# Patient Record
Sex: FEMALE | Race: WHITE | NOT HISPANIC OR LATINO | Employment: FULL TIME | URBAN - NONMETROPOLITAN AREA
[De-identification: names, ages, dates, MRNs, and addresses within clinical notes are randomized per-mention and may not be internally consistent; named-entity substitution may affect disease eponyms.]

---

## 2023-12-07 ENCOUNTER — OFFICE VISIT (OUTPATIENT)
Dept: ENDOCRINOLOGY | Facility: CLINIC | Age: 38
End: 2023-12-07
Payer: COMMERCIAL

## 2023-12-07 ENCOUNTER — TELEPHONE (OUTPATIENT)
Dept: ENDOCRINOLOGY | Facility: CLINIC | Age: 38
End: 2023-12-07

## 2023-12-07 ENCOUNTER — LAB (OUTPATIENT)
Dept: LAB | Facility: HOSPITAL | Age: 38
End: 2023-12-07
Payer: COMMERCIAL

## 2023-12-07 VITALS
HEART RATE: 76 BPM | HEIGHT: 64 IN | SYSTOLIC BLOOD PRESSURE: 126 MMHG | BODY MASS INDEX: 43.57 KG/M2 | OXYGEN SATURATION: 97 % | WEIGHT: 255.2 LBS | DIASTOLIC BLOOD PRESSURE: 84 MMHG

## 2023-12-07 DIAGNOSIS — E66.01 CLASS 3 SEVERE OBESITY WITH SERIOUS COMORBIDITY AND BODY MASS INDEX (BMI) OF 40.0 TO 44.9 IN ADULT, UNSPECIFIED OBESITY TYPE: Primary | ICD-10-CM

## 2023-12-07 DIAGNOSIS — E22.1 HYPERPROLACTINEMIA: ICD-10-CM

## 2023-12-07 PROBLEM — E66.813 CLASS 3 SEVERE OBESITY WITH SERIOUS COMORBIDITY AND BODY MASS INDEX (BMI) OF 40.0 TO 44.9 IN ADULT: Status: ACTIVE | Noted: 2023-12-07

## 2023-12-07 LAB
ALBUMIN SERPL-MCNC: 4.5 G/DL (ref 3.5–5.2)
ALBUMIN/GLOB SERPL: 1.5 G/DL
ALP SERPL-CCNC: 93 U/L (ref 39–117)
ALT SERPL W P-5'-P-CCNC: 40 U/L (ref 1–33)
ANION GAP SERPL CALCULATED.3IONS-SCNC: 9.4 MMOL/L (ref 5–15)
AST SERPL-CCNC: 32 U/L (ref 1–32)
BILIRUB SERPL-MCNC: 0.4 MG/DL (ref 0–1.2)
BUN SERPL-MCNC: 8 MG/DL (ref 6–20)
BUN/CREAT SERPL: 9 (ref 7–25)
CALCIUM SPEC-SCNC: 9.5 MG/DL (ref 8.6–10.5)
CHLORIDE SERPL-SCNC: 107 MMOL/L (ref 98–107)
CHOLEST SERPL-MCNC: 136 MG/DL (ref 0–200)
CO2 SERPL-SCNC: 26.6 MMOL/L (ref 22–29)
CREAT SERPL-MCNC: 0.89 MG/DL (ref 0.57–1)
EGFRCR SERPLBLD CKD-EPI 2021: 86.3 ML/MIN/1.73
GLOBULIN UR ELPH-MCNC: 3 GM/DL
GLUCOSE SERPL-MCNC: 102 MG/DL (ref 65–99)
HDLC SERPL-MCNC: 45 MG/DL (ref 40–60)
LDLC SERPL CALC-MCNC: 70 MG/DL (ref 0–100)
LDLC/HDLC SERPL: 1.52 {RATIO}
POTASSIUM SERPL-SCNC: 4 MMOL/L (ref 3.5–5.2)
PROT SERPL-MCNC: 7.5 G/DL (ref 6–8.5)
SODIUM SERPL-SCNC: 143 MMOL/L (ref 136–145)
T4 FREE SERPL-MCNC: 1.15 NG/DL (ref 0.93–1.7)
TRIGL SERPL-MCNC: 113 MG/DL (ref 0–150)
TSH SERPL DL<=0.05 MIU/L-ACNC: 2.02 UIU/ML (ref 0.27–4.2)
VLDLC SERPL-MCNC: 21 MG/DL (ref 5–40)

## 2023-12-07 PROCEDURE — 84439 ASSAY OF FREE THYROXINE: CPT | Performed by: NURSE PRACTITIONER

## 2023-12-07 PROCEDURE — 84443 ASSAY THYROID STIM HORMONE: CPT | Performed by: NURSE PRACTITIONER

## 2023-12-07 PROCEDURE — 80061 LIPID PANEL: CPT | Performed by: NURSE PRACTITIONER

## 2023-12-07 PROCEDURE — 80053 COMPREHEN METABOLIC PANEL: CPT | Performed by: NURSE PRACTITIONER

## 2023-12-07 RX ORDER — CABERGOLINE 0.5 MG/1
0.5 TABLET ORAL 2 TIMES WEEKLY
Qty: 8 TABLET | Refills: 0 | Status: SHIPPED | OUTPATIENT
Start: 2023-12-07 | End: 2024-12-06

## 2023-12-07 RX ORDER — ATORVASTATIN CALCIUM 10 MG/1
TABLET, FILM COATED ORAL
COMMUNITY
Start: 2023-10-02

## 2023-12-07 NOTE — PROGRESS NOTES
Chief Complaint   Patient presents with    Elevated prolactin level        Referring Provider  Mario Dawson APRN HPI   Karen Al is a 38 y.o. female had concerns including Elevated prolactin level.    Seen as a new patient.  Elevated Prolactin.    She was having very irregular menstrual cycles.  [facility] to [facility] and labs and was noted to have an elevated prolactin level on labs. She has had consecutive levels above range.  She had an MRI that was normal as well.    She is not on any medication at this time that would result in hyperprolactinemia.    Weight Gain:  She has tried diet and exercise in the past and has not been able to lose weight.  She was able to lose a few pounds with weight loss injections in the past, but was no longer covered on her insruacne.  She does not eat large quanities of food.    No past medical history on file.  No past surgical history on file.   No family history on file.   Social History     Socioeconomic History    Marital status:    Tobacco Use    Smoking status: Never     Passive exposure: Never    Smokeless tobacco: Never   Substance and Sexual Activity    Alcohol use: Never    Drug use: Never    Sexual activity: Defer      No Known Allergies   Current Outpatient Medications on File Prior to Visit   Medication Sig Dispense Refill    atorvastatin (LIPITOR) 10 MG tablet       metFORMIN (GLUCOPHAGE) 500 MG tablet Take 1 tablet by mouth Daily With Breakfast.       No current facility-administered medications on file prior to visit.      The following portions of the patient's history were reviewed and updated as appropriate: allergies, current medications, past family history, past medical history, past social history, past surgical history, and problem list.    Review of Systems   Constitutional:  Positive for fatigue and unexpected weight gain.   Eyes: Negative.    Endocrine: Negative.    Skin:         Darkening skin around the back  "of her neck and armpits.   Psychiatric/Behavioral: Negative.     All other systems reviewed and are negative.     I have reviewed and confirmed the accuracy of the ROS as documented by the MA/LPN/RN CYNTHIA Rm    /84 (BP Location: Left arm, Patient Position: Sitting, Cuff Size: Adult)   Pulse 76   Ht 162.6 cm (64\")   Wt 116 kg (255 lb 3.2 oz)   SpO2 97%   BMI 43.80 kg/m²      Physical Exam  Vitals reviewed.   Constitutional:       Appearance: Normal appearance. She is obese.   Eyes:      Extraocular Movements: Extraocular movements intact.   Cardiovascular:      Rate and Rhythm: Normal rate.   Pulmonary:      Effort: Pulmonary effort is normal.   Skin:     General: Skin is warm and dry.      Comments: Acanthosis nigrans noted to posterior cervical region.   Neurological:      General: No focal deficit present.      Mental Status: She is alert and oriented to person, place, and time.   Psychiatric:         Mood and Affect: Mood normal.         Behavior: Behavior normal.         Thought Content: Thought content normal.         Judgment: Judgment normal.       Labs/Imaging  CMP:  Lab Results   Component Value Date    BUN 8 12/07/2023    CREATININE 0.89 12/07/2023    BCR 9.0 12/07/2023     12/07/2023    K 4.0 12/07/2023    CO2 26.6 12/07/2023    CALCIUM 9.5 12/07/2023    ALBUMIN 4.5 12/07/2023    BILITOT 0.4 12/07/2023    ALKPHOS 93 12/07/2023    AST 32 12/07/2023    ALT 40 (H) 12/07/2023     Lipid Panel:  Lab Results   Component Value Date    CHOL 136 12/07/2023    TRIG 113 12/07/2023    HDL 45 12/07/2023    VLDL 21 12/07/2023    LDL 70 12/07/2023     HbA1c:     Glucose:  No results found for: \"POCGLU\"  TSH:  Lab Results   Component Value Date    TSH 2.020 12/07/2023       Assessment and Plan    Diagnoses and all orders for this visit:    1. Class 3 severe obesity with serious comorbidity and body mass index (BMI) of 40.0 to 44.9 in adult, unspecified obesity type (Primary)  Assessment & " Plan:  Patient's (Body mass index is 43.8 kg/m².) indicates that they are morbidly/severely obese (BMI > 40 or > 35 with obesity - related health condition) with health conditions that include dyslipidemias . Weight is unchanged. BMI  is above average; BMI management plan is completed. We discussed low calorie, low carb based diet program, portion control, increasing exercise, and pharmacologic options including Zepbound 2.5 mg weekly . Patient has no personal history of pancreatitis, no family history of MEN syndrome or medullary thyroid cancer. Possible side effects including nausea, bloating, other GI upset and rarely pancreatitis were discussed. She was advised to call the office with any symptoms or concerns.       Orders:  -     Comprehensive Metabolic Panel  -     Lipid Panel  -     TSH  -     T4, free    2. Hyperprolactinemia  Assessment & Plan:  Labs show that patient has elevated levels.  MRI was negative for structural abnormalities.  Will start on Cabergoline 0.5 mg twice weekly.  Follow-up in 2 months.      Other orders  -     cabergoline (DOSTINEX) 0.5 MG tablet; Take 1 tablet by mouth 2 (Two) Times a Week.  Dispense: 8 tablet; Refill: 0  -     Tirzepatide-Weight Management (ZEPBOUND) 2.5 MG/0.5ML solution auto-injector; Inject 0.5 mL under the skin into the appropriate area as directed 1 (One) Time Per Week.  Dispense: 2 mL; Refill: 0         Return in about 2 months (around 2/7/2024) for Follow-up appointment. The patient was instructed to contact the clinic with any interval questions or concerns.      This document has been electronically signed by CYNTHIA Rm  December 7, 2023 11:18 EST   Endocrinology    Please note that portions of this document were completed with a voice recognition program. Efforts were made to edit the dictations, but occasionally words are mis-transcribed.

## 2023-12-07 NOTE — ASSESSMENT & PLAN NOTE
Patient's (Body mass index is 43.8 kg/m².) indicates that they are morbidly/severely obese (BMI > 40 or > 35 with obesity - related health condition) with health conditions that include dyslipidemias . Weight is unchanged. BMI  is above average; BMI management plan is completed. We discussed low calorie, low carb based diet program, portion control, increasing exercise, and pharmacologic options including Zepbound 2.5 mg weekly . Patient has no personal history of pancreatitis, no family history of MEN syndrome or medullary thyroid cancer. Possible side effects including nausea, bloating, other GI upset and rarely pancreatitis were discussed. She was advised to call the office with any symptoms or concerns.

## 2023-12-07 NOTE — TELEPHONE ENCOUNTER
Let her know that we checked and her insurance is not covering the medication.  None of the weight loss agents are covered.  She will have to call her insurance to see if they are going to be covering it.  If not, there isn't anything else that we can do to get the payment cheaper.

## 2023-12-07 NOTE — ASSESSMENT & PLAN NOTE
Labs show that patient has elevated levels.  MRI was negative for structural abnormalities.  Will start on Cabergoline 0.5 mg twice weekly.  Follow-up in 2 months.

## 2023-12-07 NOTE — TELEPHONE ENCOUNTER
Caller: Karen Al    Relationship to patient: Self    Best call back number: [phone] (home)      Patient is needing: HAS QUESTIONS TO ASK ABOUT THE FOLLOWING MEDICATION --  Tirzepatide-Weight Management (ZEPBOUND) 2.5 MG/0.5ML solution auto-injector   THE DISCOUNT CARD WASN'T ENOUGH TO COVER. IT'LL BE $900 FOR THIS MEDICATION. PLEASE CALL BACK WHEN YOU CAN. THANKS!

## 2023-12-11 NOTE — TELEPHONE ENCOUNTER
Spoke with pt and made aware. Pt stated you were going to send in a higher dose of metformin if she didn't get to start the injections, so she wondered if you could send in the higher dose.

## 2024-01-03 RX ORDER — CABERGOLINE 0.5 MG/1
TABLET ORAL
Qty: 8 TABLET | Refills: 0 | Status: SHIPPED | OUTPATIENT
Start: 2024-01-03

## 2024-02-06 RX ORDER — CABERGOLINE 0.5 MG/1
TABLET ORAL
Qty: 8 TABLET | Refills: 0 | Status: SHIPPED | OUTPATIENT
Start: 2024-02-06

## 2024-02-16 ENCOUNTER — OFFICE VISIT (OUTPATIENT)
Dept: ENDOCRINOLOGY | Facility: CLINIC | Age: 39
End: 2024-02-16
Payer: COMMERCIAL

## 2024-02-16 VITALS
DIASTOLIC BLOOD PRESSURE: 69 MMHG | BODY MASS INDEX: 42.16 KG/M2 | OXYGEN SATURATION: 99 % | HEART RATE: 64 BPM | WEIGHT: 245.6 LBS | SYSTOLIC BLOOD PRESSURE: 100 MMHG | TEMPERATURE: 97.9 F

## 2024-02-16 DIAGNOSIS — E22.1 HYPERPROLACTINEMIA: Primary | ICD-10-CM

## 2024-02-16 LAB — PROLACTIN SERPL-MCNC: 0.36 NG/ML (ref 4.79–23.3)

## 2024-02-16 PROCEDURE — 84146 ASSAY OF PROLACTIN: CPT | Performed by: NURSE PRACTITIONER

## 2024-04-09 RX ORDER — CABERGOLINE 0.5 MG/1
0.5 TABLET ORAL 2 TIMES WEEKLY
Qty: 8 TABLET | Refills: 2 | Status: SHIPPED | OUTPATIENT
Start: 2024-04-11

## 2024-06-11 ENCOUNTER — OFFICE VISIT (OUTPATIENT)
Dept: ENDOCRINOLOGY | Facility: CLINIC | Age: 39
End: 2024-06-11
Payer: COMMERCIAL

## 2024-06-11 VITALS
OXYGEN SATURATION: 99 % | DIASTOLIC BLOOD PRESSURE: 74 MMHG | BODY MASS INDEX: 36.88 KG/M2 | HEIGHT: 64 IN | SYSTOLIC BLOOD PRESSURE: 109 MMHG | HEART RATE: 84 BPM | WEIGHT: 216 LBS

## 2024-06-11 DIAGNOSIS — E78.00 HYPERCHOLESTEREMIA: ICD-10-CM

## 2024-06-11 DIAGNOSIS — E22.1 HYPERPROLACTINEMIA: Primary | ICD-10-CM

## 2024-06-11 LAB
CHOLEST SERPL-MCNC: 214 MG/DL (ref 0–200)
HDLC SERPL-MCNC: 35 MG/DL (ref 40–60)
LDLC SERPL CALC-MCNC: 145 MG/DL (ref 0–100)
LDLC/HDLC SERPL: 4.05 {RATIO}
TRIGL SERPL-MCNC: 186 MG/DL (ref 0–150)
VLDLC SERPL-MCNC: 34 MG/DL (ref 5–40)

## 2024-06-11 PROCEDURE — 84146 ASSAY OF PROLACTIN: CPT | Performed by: NURSE PRACTITIONER

## 2024-06-11 PROCEDURE — 99214 OFFICE O/P EST MOD 30 MIN: CPT | Performed by: NURSE PRACTITIONER

## 2024-06-11 PROCEDURE — 80061 LIPID PANEL: CPT | Performed by: NURSE PRACTITIONER

## 2024-06-11 RX ORDER — LEVONORGESTREL/ETHIN.ESTRADIOL 0.1-0.02MG
1 TABLET ORAL DAILY
COMMUNITY

## 2024-06-11 NOTE — PROGRESS NOTES
Chief Complaint   Patient presents with    Hyperprolactinemia        Referring Provider  No ref. provider found     HPI   Karen Al is a 39 y.o. female had concerns including Hyperprolactinemia.    Elevated Prolactin.    She forgot to refill her medication and has not had any in a month. She reports that she had not had labs since her last visit.  She was taking Cabergoline 0.5 mg twice weekly.    History:  She was having very irregular menstrual cycles.  She scheduled an appt with [facility] to have pap and labs and was noted to have an elevated prolactin level on labs. She has had consecutive levels above range.  She had an MRI that was normal as well.    She is not on any medication at this time that would result in hyperprolactinemia.    Weight Gain:  She has tried diet and exercise in the past and has not been able to lose weight.  She was able to lose a few pounds with weight loss injections in the past, but was no longer covered on her insruacne.  She does not eat large quanities of food.    No past medical history on file.  Past Surgical History:   Procedure Laterality Date     SECTION      TUBAL ABDOMINAL LIGATION        Family History   Problem Relation Age of Onset    Cancer Mother     Thyroid disease Mother       Social History     Socioeconomic History    Marital status:    Tobacco Use    Smoking status: Never     Passive exposure: Never    Smokeless tobacco: Never   Substance and Sexual Activity    Alcohol use: Never    Drug use: Never    Sexual activity: Defer      No Known Allergies   Current Outpatient Medications on File Prior to Visit   Medication Sig Dispense Refill    levonorgestrel-ethinyl estradiol (AVIANE,ALESSE,LESSINA) 0.1-20 MG-MCG per tablet Take 1 tablet by mouth Daily.      Tirzepatide (Mounjaro) 5 MG/0.5ML solution pen-injector pen Inject 0.5 mL under the skin into the appropriate area as directed 1 (One) Time Per Week.      atorvastatin (LIPITOR) 10 MG tablet   "(Patient not taking: Reported on 6/11/2024)      cabergoline (DOSTINEX) 0.5 MG tablet TAKE 1 TABLET BY MOUTH TWICE A WEEK (Patient not taking: Reported on 6/11/2024) 8 tablet 2    metFORMIN (GLUCOPHAGE) 500 MG tablet TAKE TWO TABLETS BY MOUTH TWICE A DAY WITH A MEAL (Patient not taking: Reported on 6/11/2024) 120 tablet 2    Tirzepatide-Weight Management (ZEPBOUND) 2.5 MG/0.5ML solution auto-injector Inject 0.5 mL under the skin into the appropriate area as directed 1 (One) Time Per Week. (Patient not taking: Reported on 6/11/2024) 2 mL 0     No current facility-administered medications on file prior to visit.      The following portions of the patient's history were reviewed and updated as appropriate: allergies, current medications, past family history, past medical history, past social history, past surgical history, and problem list.    Review of Systems   Constitutional:  Positive for fatigue and unexpected weight gain.   Eyes: Negative.    Endocrine: Negative.    Genitourinary:  Negative for breast discharge.   Skin:         Darkening skin around the back of her neck and armpits.   Neurological:  Negative for headache.   Psychiatric/Behavioral: Negative.     All other systems reviewed and are negative.    /74 (BP Location: Right arm, Patient Position: Sitting, Cuff Size: Adult)   Pulse 84   Ht 162.6 cm (64\")   Wt 98 kg (216 lb)   SpO2 99%   BMI 37.08 kg/m²      Physical Exam  Vitals reviewed.   Constitutional:       Appearance: Normal appearance. She is obese.   Eyes:      Extraocular Movements: Extraocular movements intact.   Cardiovascular:      Rate and Rhythm: Normal rate.   Pulmonary:      Effort: Pulmonary effort is normal.   Skin:     General: Skin is warm and dry.      Comments: Acanthosis nigrans noted to posterior cervical region.   Neurological:      General: No focal deficit present.      Mental Status: She is alert and oriented to person, place, and time.   Psychiatric:         Mood and " "Affect: Mood normal.         Behavior: Behavior normal.         Thought Content: Thought content normal.         Judgment: Judgment normal.       Labs/Imaging  CMP:  Lab Results   Component Value Date    BUN 8 12/07/2023    CREATININE 0.89 12/07/2023    BCR 9.0 12/07/2023     12/07/2023    K 4.0 12/07/2023    CO2 26.6 12/07/2023    CALCIUM 9.5 12/07/2023    ALBUMIN 4.5 12/07/2023    BILITOT 0.4 12/07/2023    ALKPHOS 93 12/07/2023    AST 32 12/07/2023    ALT 40 (H) 12/07/2023     Lipid Panel:  Lab Results   Component Value Date    CHOL 136 12/07/2023    TRIG 113 12/07/2023    HDL 45 12/07/2023    VLDL 21 12/07/2023    LDL 70 12/07/2023     HbA1c:     Glucose:  No results found for: \"POCGLU\"  TSH:  Lab Results   Component Value Date    TSH 2.020 12/07/2023       Assessment and Plan    Diagnoses and all orders for this visit:    1. Hyperprolactinemia (Primary)  Assessment & Plan:  Clinically stable.  Will obtain labs today to determine prolactin levels.  Will start back on Cabergoline if labs are indicative.   Follow-up in 6 months.    Orders:  -     Prolactin    2. Hypercholesteremia  Assessment & Plan:   Lipid abnormalities are  unchanged.    Plan:  Will obtain labs to determine if restarting of medication is indicated.      Patient Treatment Goals:   Total Cholesterol Goal is less than 180    Followup in 6 months.    Orders:  -     Lipid Panel             Return in about 6 months (around 12/11/2024) for Follow-up appointment. The patient was instructed to contact the clinic with any interval questions or concerns.      This document has been electronically signed by CYNTHIA Rm  June 11, 2024 10:30 EDT   Endocrinology    Please note that portions of this document were completed with a voice recognition program. Efforts were made to edit the dictations, but occasionally words are mis-transcribed.   "

## 2024-06-11 NOTE — ASSESSMENT & PLAN NOTE
Clinically stable.  Will obtain labs today to determine prolactin levels.  Will start back on Cabergoline if labs are indicative.   Follow-up in 6 months.

## 2024-06-12 LAB — PROLACTIN SERPL-MCNC: 3.12 NG/ML (ref 4.79–23.3)

## 2024-12-11 ENCOUNTER — OFFICE VISIT (OUTPATIENT)
Dept: ENDOCRINOLOGY | Facility: CLINIC | Age: 39
End: 2024-12-11
Payer: COMMERCIAL

## 2024-12-11 VITALS
WEIGHT: 197 LBS | BODY MASS INDEX: 33.81 KG/M2 | SYSTOLIC BLOOD PRESSURE: 96 MMHG | OXYGEN SATURATION: 98 % | DIASTOLIC BLOOD PRESSURE: 61 MMHG | HEART RATE: 72 BPM

## 2024-12-11 DIAGNOSIS — E78.00 HYPERCHOLESTEREMIA: ICD-10-CM

## 2024-12-11 DIAGNOSIS — E22.1 HYPERPROLACTINEMIA: Primary | ICD-10-CM

## 2024-12-11 LAB
CHOLEST SERPL-MCNC: 127 MG/DL (ref 0–200)
HDLC SERPL-MCNC: 39 MG/DL (ref 40–60)
LDLC SERPL CALC-MCNC: 73 MG/DL (ref 0–100)
LDLC/HDLC SERPL: 1.88 {RATIO}
PROLACTIN SERPL-MCNC: 38.6 NG/ML (ref 4.79–23.3)
TRIGL SERPL-MCNC: 74 MG/DL (ref 0–150)
VLDLC SERPL-MCNC: 15 MG/DL (ref 5–40)

## 2024-12-11 PROCEDURE — 80061 LIPID PANEL: CPT | Performed by: NURSE PRACTITIONER

## 2024-12-11 PROCEDURE — 84146 ASSAY OF PROLACTIN: CPT | Performed by: NURSE PRACTITIONER

## 2024-12-11 RX ORDER — LEVONORGESTREL AND ETHINYL ESTRADIOL 0.1-0.02MG
KIT ORAL
COMMUNITY
Start: 2024-11-25

## 2024-12-11 NOTE — ASSESSMENT & PLAN NOTE
Clinically stable.  Will obtain labs today to determine prolactin levels.  Will start back on Cabergoline if labs are indicative.   Follow-up in 1 year.

## 2024-12-11 NOTE — ASSESSMENT & PLAN NOTE
Lipid abnormalities are  unchanged.    Plan:  Will obtain labs to determine if medication adjustment is indicated.      Patient Treatment Goals:   Total Cholesterol Goal is less than 180    Followup in 1 year.

## 2024-12-11 NOTE — PROGRESS NOTES
Chief Complaint   Patient presents with    Follow-up        Referring Provider  No ref. provider found     HPI   Karen Al is a 39 y.o. female had concerns including Follow-up.    Elevated Prolactin.    She has regulated labs at next visit and her meds were stopped. She has not been taking Cabergoline since then.  She denies having any breast lactation or tenderness. She denies any other related symptoms.    History:  She was having very irregular menstrual cycles.  She scheduled an appt with [facility] to have pap and labs and was noted to have an elevated prolactin level on labs. She has had consecutive levels above range.  She had an MRI that was normal as well.    She is not on any medication at this time that would result in hyperprolactinemia.    Weight Gain:  She has tried diet and exercise in the past and has not been able to lose weight.  She was able to lose a few pounds with weight loss injections in the past, but was no longer covered on her insruacne.  She does not eat large quanities of food.    Hypercholesterolemia:  She has been taking Atorvastatin 10 mg QD. She has been taking this regularly without missing doses.     History reviewed. No pertinent past medical history.  Past Surgical History:   Procedure Laterality Date     SECTION      TUBAL ABDOMINAL LIGATION        Family History   Problem Relation Age of Onset    Cancer Mother         Cervical    Thyroid disease Mother         Under active      Social History     Socioeconomic History    Marital status:    Tobacco Use    Smoking status: Never     Passive exposure: Never    Smokeless tobacco: Never   Vaping Use    Vaping status: Never Used   Substance and Sexual Activity    Alcohol use: Never    Drug use: Never    Sexual activity: Defer      No Known Allergies   Current Outpatient Medications on File Prior to Visit   Medication Sig Dispense Refill    atorvastatin (LIPITOR) 10 MG tablet       Tirzepatide (Mounjaro) 5 MG/0.5ML  solution pen-injector pen Inject 10 mg under the skin into the appropriate area as directed 1 (One) Time Per Week.      Tyblume 0.1-20 MG-MCG chewable tablet       metFORMIN (GLUCOPHAGE) 500 MG tablet TAKE TWO TABLETS BY MOUTH TWICE A DAY WITH A MEAL (Patient not taking: Reported on 6/11/2024) 120 tablet 2    Tirzepatide-Weight Management (ZEPBOUND) 2.5 MG/0.5ML solution auto-injector Inject 0.5 mL under the skin into the appropriate area as directed 1 (One) Time Per Week. (Patient not taking: Reported on 12/11/2024) 2 mL 0    [DISCONTINUED] cabergoline (DOSTINEX) 0.5 MG tablet TAKE 1 TABLET BY MOUTH TWICE A WEEK (Patient not taking: Reported on 6/11/2024) 8 tablet 2    [DISCONTINUED] levonorgestrel-ethinyl estradiol (AVIANE,ALESSE,LESSINA) 0.1-20 MG-MCG per tablet Take 1 tablet by mouth Daily.       No current facility-administered medications on file prior to visit.      The following portions of the patient's history were reviewed and updated as appropriate: allergies, current medications, past family history, past medical history, past social history, past surgical history, and problem list.    Review of Systems   Constitutional:  Positive for fatigue and unexpected weight gain.   Eyes: Negative.    Endocrine: Negative.    Genitourinary:  Negative for breast discharge.   Skin:         Darkening skin around the back of her neck and armpits.   Neurological:  Negative for headache.   Psychiatric/Behavioral: Negative.     All other systems reviewed and are negative.    BP 96/61 (BP Location: Right arm, Patient Position: Sitting, Cuff Size: Adult)   Pulse 72   Wt 89.4 kg (197 lb)   SpO2 98%   Breastfeeding No   BMI 33.81 kg/m²      Physical Exam  Vitals reviewed.   Constitutional:       Appearance: Normal appearance. She is obese.   Eyes:      Extraocular Movements: Extraocular movements intact.   Cardiovascular:      Rate and Rhythm: Normal rate.   Pulmonary:      Effort: Pulmonary effort is normal.   Skin:      "General: Skin is warm and dry.      Comments: Acanthosis nigrans noted to posterior cervical region.   Neurological:      General: No focal deficit present.      Mental Status: She is alert and oriented to person, place, and time.   Psychiatric:         Mood and Affect: Mood normal.         Behavior: Behavior normal.         Thought Content: Thought content normal.         Judgment: Judgment normal.       Labs/Imaging  CMP:  Lab Results   Component Value Date    Glucose 102 (H) 12/07/2023    BUN 8 12/07/2023    BUN/Creatinine Ratio 9.0 12/07/2023    Creatinine 0.89 12/07/2023    CO2 26.6 12/07/2023    Calcium 9.5 12/07/2023    Albumin 4.5 12/07/2023    AST (SGOT) 32 12/07/2023    ALT (SGPT) 40 (H) 12/07/2023     Lipid Panel:  Lab Results   Component Value Date    CHOL 214 (H) 06/11/2024    TRIG 186 (H) 06/11/2024    HDL 35 (L) 06/11/2024    VLDL 34 06/11/2024     (H) 06/11/2024     HbA1c:  No components found for: \"HGBA1C\"  Glucose:  No results found for: \"POCGLU\"  TSH:  Lab Results   Component Value Date    TSH 2.020 12/07/2023       Assessment and Plan    Diagnoses and all orders for this visit:    1. Hyperprolactinemia (Primary)  Assessment & Plan:  Clinically stable.  Will obtain labs today to determine prolactin levels.  Will start back on Cabergoline if labs are indicative.   Follow-up in 1 year.    Orders:  -     Prolactin    2. Hypercholesteremia  Assessment & Plan:   Lipid abnormalities are  unchanged.    Plan:  Will obtain labs to determine if medication adjustment is indicated.      Patient Treatment Goals:   Total Cholesterol Goal is less than 180    Followup in 1 year.    Orders:  -     Lipid Panel               Return in about 1 year (around 12/11/2025) for Follow-up appointment. The patient was instructed to contact the clinic with any interval questions or concerns.      This document has been electronically signed by CYNTHIA Rm  December 11, 2024 16:36 EST "   Endocrinology    Please note that portions of this document were completed with a voice recognition program. Efforts were made to edit the dictations, but occasionally words are mis-transcribed.